# Patient Record
Sex: FEMALE | Race: WHITE | NOT HISPANIC OR LATINO | URBAN - METROPOLITAN AREA
[De-identification: names, ages, dates, MRNs, and addresses within clinical notes are randomized per-mention and may not be internally consistent; named-entity substitution may affect disease eponyms.]

---
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
## 2022-02-12 VITALS
TEMPERATURE: 98.4 F | HEIGHT: 68 IN | BODY MASS INDEX: 23.34 KG/M2 | WEIGHT: 154 LBS | DIASTOLIC BLOOD PRESSURE: 61 MMHG | SYSTOLIC BLOOD PRESSURE: 108 MMHG | HEART RATE: 62 BPM

## 2022-02-16 NOTE — PROGRESS NOTES
1) Start Zyrtec today (antihistamine)  2) Fluticasone nasal spray - 2 sprays once daily  3) Cough drops- for sore throat (comes from post nasal drip)  4) Cough syrup as needed at bedtime  5) Rest  6) Come back if symptoms worsen or do not improve - can take up to 8 weeks

## 2022-02-16 NOTE — PROGRESS NOTES
Patient:   MARK MAK            MRN: [id number]            FIN: [id number]               Age:   17 years     Sex:  Female     :  2000   Associated Diagnoses:   Post-viral cough syndrome   Author:   Kayla Oconnor      Visit Information      Date of Service: 2018 03:27 pm  Performing Location: [facility]  Encounter#: [id number]      Chief Complaint   2018 3:29 PM CST    Patient is here with c/o cough, congestion for past 2 weeks.      History of Present Illness   Chief complaint reviewed and confirmed with patient.    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  Her symptoms have been present approximately two weeks.  She felt as if they were initially getting better and now have worsened again.  At times the cough is productive.  She denies headache, sinus pain or pressure or ear pain.  She also denies shortness of breath or wheezing.  She is afebrile.  She is concerned because it appears to her as if her viral symptoms are not improving.  She does not have a history of asthma or any other chronic illnesses.    HEALTH HABITS  She is a nonsmoker.              Review of Systems   Constitutional:  Negative except as documented in history of present illness.    Eye:  Negative.    Ear/Nose/Mouth/Throat:  Negative except as documented in history of present illness.    Respiratory:  Negative except as documented in history of present illness.    Cardiovascular:  Negative.    Gastrointestinal:  Negative.    Musculoskeletal:  Negative.    Neurologic:  Negative.       Health Status   Allergies:    Allergic Reactions (Selected)  No Known Medication Allergies      Physical Examination   Vital Signs   2018 3:29 PM CST Temperature Tympanic 98.4 DegF    Peripheral Pulse Rate 62 bpm    HR Method Electronic    Systolic Blood Pressure 108 mmHg    Diastolic Blood Pressure 61 mmHg    Mean Arterial  Pressure 77 mmHg    BP Site Right arm    BP Method Electronic      General:  Alert and oriented, Mild distress.    Eye:  Normal conjunctiva.    HENT:          Nose: Both nostrils, Discharge ( Moderate amount ).         Sinus: Bilateral, Within normal limits.         Mouth: Within normal limits.         Throat: Pharynx ( Erythematous ).    Neck:  No lymphadenopathy.    Respiratory:  Lungs are clear to auscultation, Respirations are non-labored, Breath sounds are equal.    Cardiovascular:  Normal rate, Regular rhythm.    Neurologic:  Alert, Oriented.    Psychiatric:  Cooperative, Appropriate mood & affect.       Impression and Plan   Diagnosis     Post-viral cough syndrome ([id number]).     Course:  Progressing as expected.    Patient Instructions:       Counseled: Patient, Regarding diagnosis, Regarding medications, Verbalized understanding.      PLAN  1)  Discussed postviral cough syndrome with the patient.  2)  Written instruction given to patient.  3)  Instructed patient to start Zyrtec or another over-the-counter antihistamine.  4)  Instructed patient to use Fluticasone nasal spray, 2 sprays in each nostril daily.  5)  Cough drops for sore throat.  Etiology of sore throat explained to patient, most likely due to postnasal drip.  6)  Cough syrup is needed at bedtime for cough.  7)  Rest, warm tea, chicken soup and other comfort measures.    8)  If symptoms worsen or do not improve she should return to clinic or as needed.